# Patient Record
Sex: FEMALE | Race: WHITE | NOT HISPANIC OR LATINO
[De-identification: names, ages, dates, MRNs, and addresses within clinical notes are randomized per-mention and may not be internally consistent; named-entity substitution may affect disease eponyms.]

---
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
## 2025-04-23 NOTE — ED PROVIDER NOTE - PRINCIPAL DIAGNOSIS
Ankle sprain Clinic appt scheduled with pt on Wednesday, April 30, 2025 at 945am.  Clinic address given with instructions to check in on 1st floor [facility] prior to coming to [address].